# Patient Record
Sex: FEMALE | Race: WHITE | NOT HISPANIC OR LATINO | Employment: OTHER | URBAN - METROPOLITAN AREA
[De-identification: names, ages, dates, MRNs, and addresses within clinical notes are randomized per-mention and may not be internally consistent; named-entity substitution may affect disease eponyms.]

---

## 2017-08-30 ENCOUNTER — APPOINTMENT (OUTPATIENT)
Dept: WOMENS IMAGING | Facility: HOSPITAL | Age: 72
End: 2017-08-30

## 2017-08-30 PROCEDURE — G0202 SCR MAMMO BI INCL CAD: HCPCS | Performed by: RADIOLOGY

## 2017-08-30 PROCEDURE — 77063 BREAST TOMOSYNTHESIS BI: CPT | Performed by: RADIOLOGY

## 2018-07-25 ENCOUNTER — OFFICE VISIT (OUTPATIENT)
Dept: FAMILY MEDICINE CLINIC | Facility: CLINIC | Age: 73
End: 2018-07-25

## 2018-07-25 VITALS
RESPIRATION RATE: 16 BRPM | WEIGHT: 124.9 LBS | BODY MASS INDEX: 22.13 KG/M2 | HEART RATE: 76 BPM | OXYGEN SATURATION: 94 % | TEMPERATURE: 98 F | DIASTOLIC BLOOD PRESSURE: 84 MMHG | HEIGHT: 63 IN | SYSTOLIC BLOOD PRESSURE: 120 MMHG

## 2018-07-25 DIAGNOSIS — I10 INTERMITTENT HYPERTENSION: ICD-10-CM

## 2018-07-25 DIAGNOSIS — Z00.00 ROUTINE HEALTH MAINTENANCE: ICD-10-CM

## 2018-07-25 DIAGNOSIS — Z00.00 MEDICARE ANNUAL WELLNESS VISIT, INITIAL: Primary | ICD-10-CM

## 2018-07-25 DIAGNOSIS — E55.9 VITAMIN D DEFICIENCY: ICD-10-CM

## 2018-07-25 DIAGNOSIS — H40.9 GLAUCOMA OF BOTH EYES, UNSPECIFIED GLAUCOMA TYPE: ICD-10-CM

## 2018-07-25 LAB
25(OH)D3+25(OH)D2 SERPL-MCNC: 47.7 NG/ML
ALBUMIN SERPL-MCNC: 4.7 G/DL (ref 3.5–5.2)
ALBUMIN/GLOB SERPL: 2.5 G/DL
ALP SERPL-CCNC: 84 U/L (ref 40–129)
ALT SERPL-CCNC: 20 U/L (ref 5–33)
AST SERPL-CCNC: 23 U/L (ref 5–32)
BASOPHILS # BLD AUTO: 0.03 10*3/MM3 (ref 0–0.2)
BASOPHILS NFR BLD AUTO: 0.7 % (ref 0–2)
BILIRUB SERPL-MCNC: 0.8 MG/DL (ref 0.2–1.2)
BUN SERPL-MCNC: 14 MG/DL (ref 8–23)
BUN/CREAT SERPL: 16.9 (ref 7–25)
CALCIUM SERPL-MCNC: 9.8 MG/DL (ref 8.8–10.5)
CHLORIDE SERPL-SCNC: 99 MMOL/L (ref 98–107)
CHOLEST SERPL-MCNC: 208 MG/DL (ref 0–200)
CO2 SERPL-SCNC: 31.4 MMOL/L (ref 22–29)
CREAT SERPL-MCNC: 0.83 MG/DL (ref 0.57–1)
EOSINOPHIL # BLD AUTO: 0.06 10*3/MM3 (ref 0.1–0.3)
EOSINOPHIL NFR BLD AUTO: 1.5 % (ref 0–4)
ERYTHROCYTE [DISTWIDTH] IN BLOOD BY AUTOMATED COUNT: 12.9 % (ref 11.5–14.5)
GLOBULIN SER CALC-MCNC: 1.9 GM/DL
GLUCOSE SERPL-MCNC: 102 MG/DL (ref 65–99)
HCT VFR BLD AUTO: 44.2 % (ref 37–47)
HDLC SERPL-MCNC: 59 MG/DL (ref 40–60)
HGB BLD-MCNC: 14.7 G/DL (ref 12–16)
IMM GRANULOCYTES # BLD: 0 10*3/MM3 (ref 0–0.03)
IMM GRANULOCYTES NFR BLD: 0 % (ref 0–0.5)
LDLC SERPL CALC-MCNC: 118 MG/DL (ref 0–100)
LYMPHOCYTES # BLD AUTO: 1.68 10*3/MM3 (ref 0.6–4.8)
LYMPHOCYTES NFR BLD AUTO: 41 % (ref 20–45)
MCH RBC QN AUTO: 31.7 PG (ref 27–31)
MCHC RBC AUTO-ENTMCNC: 33.3 G/DL (ref 31–37)
MCV RBC AUTO: 95.3 FL (ref 81–99)
MONOCYTES # BLD AUTO: 0.42 10*3/MM3 (ref 0–1)
MONOCYTES NFR BLD AUTO: 10.2 % (ref 3–8)
NEUTROPHILS # BLD AUTO: 1.91 10*3/MM3 (ref 1.5–8.3)
NEUTROPHILS NFR BLD AUTO: 46.6 % (ref 45–70)
NRBC BLD AUTO-RTO: 0 /100 WBC (ref 0–0)
PLATELET # BLD AUTO: 228 10*3/MM3 (ref 140–500)
POTASSIUM SERPL-SCNC: 4.2 MMOL/L (ref 3.5–5.2)
PROT SERPL-MCNC: 6.6 G/DL (ref 6–8.5)
RBC # BLD AUTO: 4.64 10*6/MM3 (ref 4.2–5.4)
SODIUM SERPL-SCNC: 142 MMOL/L (ref 136–145)
TRIGL SERPL-MCNC: 155 MG/DL (ref 0–150)
TSH SERPL DL<=0.005 MIU/L-ACNC: 1.44 MIU/ML (ref 0.27–4.2)
VLDLC SERPL CALC-MCNC: 31 MG/DL (ref 7–27)
WBC # BLD AUTO: 4.1 10*3/MM3 (ref 4.8–10.8)

## 2018-07-25 PROCEDURE — 99203 OFFICE O/P NEW LOW 30 MIN: CPT | Performed by: FAMILY MEDICINE

## 2018-07-25 PROCEDURE — G0439 PPPS, SUBSEQ VISIT: HCPCS | Performed by: FAMILY MEDICINE

## 2018-07-25 NOTE — PROGRESS NOTES
QUICK REFERENCE INFORMATION:  The ABCs of Providing the Welcome to Medicare Wellness Visit   CMS.gov Learning Network    Welcome to Medicare Visit    Subjective   History of Present Illness    Yasmeen Eddy is a 73 y.o. female [place] presenting for a Welcome to Medicare Visit. In addition, we addressed the following health issues:  Yasmeen completed a personal history form and ir has been reviewed and discussed in its entirety.   Pt had a cyst removed from right breast several years ago, tonsils removed as a child, broken ankle as a child.  Pt has cardiovascular disease in first degree relative.  Her father  in his 60's of a heart attack.  Pt see's Dr Wallace for glaucoma.  Pt does play tennis and has had some periods of shortness of breath although this has stopped over the last couple of weeks but she has been concerned with this since she has glaucoma.  Pt has has 2 pregnancies, no miscarriages.  She see's Dr Sheila Segura for GYN needs.  Her PCN allergy is from as a child she is unsure of the reaction.  Pt does have problems falling asleep at times.  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  Pt is fasting to have labs drawn today.  There has been a history of intermittent hypertension.  Currently blood pressure is in a good place without the use of medication.  She believes that much of this is secondary to inappropriate breathing.  Patient denies shortness of air, chest pain or decreased stamina.      PMH, PSH, SocHx, FamHx, Allergies, and Medications: Reviewed and updated in the Visit Navigator.     Outpatient Medications Prior to Visit   Medication Sig Dispense Refill   • ascorbic acid ( VITAMIN C) 1000 MG tablet Take 1,000 mg by mouth daily.     • B Complex Vitamins (VITAMIN B COMPLEX PO) Take 1 tablet by mouth daily.     • bimatoprost (LUMIGAN) 0.01 % ophthalmic drops Administer 1 drop to both eyes every night.     • CALCIUM & MAGNESIUM CARBONATES PO Take 1 tablet by  mouth daily.     • Cholecalciferol (VITAMIN D) 2000 UNITS capsule Take 4 capsules by mouth daily.     • cholecalciferol (VITAMIN D3) 1000 UNITS tablet Take 4,000 Units by mouth daily.     • DHEA 10 MG capsule Take 1 capsule by mouth daily.     • Flaxseed, Linseed, (FLAX SEED OIL PO) Take 5 mL by mouth daily.     • Multiple Vitamin (MULTI VITAMIN DAILY PO) Take 1 tablet by mouth daily.     • Omega-3 Fatty Acids (OMEGA 3 PO) Take 1 capsule by mouth daily.     • Pregnenolone powder 1 capsule daily.     • Probiotic Product (PROBIOTIC DAILY PO) Take 1 capsule by mouth daily.     • hydrocortisone 0.5 % cream Apply  topically Every 6 (Six) Hours As Needed for Rash. 56 g 0   • mupirocin (BACTROBAN) 2 % ointment Apply  topically 3 (Three) Times a Day. 1 each 0   • PredniSONE (DELTASONE) 10 MG (21) tablet pack Take  by mouth Daily. Use as directed on package 1 each 0   • PredniSONE (DELTASONE) 10 MG (21) tablet pack Take  by mouth Daily. Use as directed on package 21 tablet 0     No facility-administered medications prior to visit.        Patient Active Problem List   Diagnosis   • Intermittent hypertension   • Glaucoma of both eyes   • Vitamin D deficiency       Health Habits:  Dental Exam. up to date  Eye Exam. up to date  Exercise: 7 times/week.  Current exercise activities include: yoga.    Social:  See review in SnapShot activity and in SocHx section of Visit Navigator.    Health Risk Assessment:  The patient has completed a Health Risk Assessment. This has been reviewed with them and has been scanned into [employer] as a separate document.    Current Medical Providers:  Patient Care Team:  Reynaldo Cardoso DO as PCP - General (Family Medicine)    The [facility] providers who are involved in the care of this patient are listed above. Additional providers and suppliers are listed below:  none    Recent Hospitalizations:  No recent hospitalization(s)..     Age-appropriate Screening Schedule:  Refer to the list below for  future screening recommendations based on patient's age. Orders for these recommended tests are listed in the plan section. The patient has been provided with a written plan.    Health Maintenance   Topic Date Due   • TDAP/TD VACCINES (1 - Tdap) 01/28/1966   • ZOSTER VACCINE (1 of 2) 01/28/1997   • PNEUMOCOCCAL VACCINES (65+ LOW/MEDIUM RISK) (1 of 2 - PCV13) 01/28/2012   • COLONOSCOPY  05/03/2018   • HEPATITIS C SCREENING  07/25/2018   • MEDICARE ANNUAL WELLNESS  07/25/2018   • INFLUENZA VACCINE  08/01/2018   • MAMMOGRAM  08/30/2019       Depression Screen:   PHQ-2/PHQ-9 Depression Screening 7/25/2018   Little interest or pleasure in doing things 0   Feeling down, depressed, or hopeless 0   Trouble falling or staying asleep, or sleeping too much 2   Feeling tired or having little energy 0   Poor appetite or overeating 0   Feeling bad about yourself - or that you are a failure or have let yourself or your family down 0   Trouble concentrating on things, such as reading the newspaper or watching television 0   Moving or speaking so slowly that other people could have noticed. Or the opposite - being so fidgety or restless that you have been moving around a lot more than usual 0   Thoughts that you would be better off dead, or of hurting yourself in some way 0   Total Score 2   If you checked off any problems, how difficult have these problems made it for you to do your work, take care of things at home, or get along with other people? Not difficult at all       Functional and Cognitive Screening:  Functional & Cognitive Status 7/25/2018   Do you have difficulty preparing food and eating? No   Do you have difficulty bathing yourself, getting dressed or grooming yourself? No   Do you have difficulty using the toilet? No   Do you have difficulty moving around from place to place? No   Do you have trouble with steps or getting out of a bed or a chair? No   In the past year have you fallen or experienced a near fall? No    Current Diet Well Balanced Diet   Dental Exam Up to date   Eye Exam Up to date   Exercise (times per week) 7 times per week   Current Exercise Activities Include Yoga   Do you need help using the phone?  No   Are you deaf or do you have serious difficulty hearing?  No   Do you need help with transportation? No   Do you need help shopping? No   Do you need help preparing meals?  No   Do you need help with housework?  No   Do you need help with laundry? No   Do you need help taking your medications? No   Do you need help managing money? No   Do you ever drive or ride in a car without wearing a seat belt? Yes   Have you felt unusual stress, anger or loneliness in the last month? No   Who do you live with? Spouse   If you need help, do you have trouble finding someone available to you? No   Have you been bothered in the last four weeks by sexual problems? No   Do you have difficulty concentrating, remembering or making decisions? No       Does the patient have evidence of cognitive impairment? No    Advanced Care Planning:  has NO advance directive - not interested in additional information    Identification of Risk Factors:  Risk factors include: weight , unhealthy diet, cardiovascular risk and inactivity.    Review of Systems   Psychiatric/Behavioral: Positive for sleep disturbance.   All other systems reviewed and are negative.      Pertinent items are noted in HPI.    Objective    Physical Exam   Constitutional: She is oriented to person, place, and time. She appears well-developed and well-nourished.   HENT:   Head: Normocephalic and atraumatic.   Neck: Trachea normal and phonation normal. Neck supple. Normal carotid pulses present. Carotid bruit is not present. No thyroid mass and no thyromegaly present.   Cardiovascular: Normal rate, regular rhythm and normal heart sounds.  Exam reveals no gallop and no friction rub.    No murmur heard.  Pulmonary/Chest: Effort normal and breath sounds normal. No respiratory  "distress. She has no decreased breath sounds. She has no wheezes. She has no rhonchi. She has no rales.   Lymphadenopathy:     She has no cervical adenopathy.   Neurological: She is alert and oriented to person, place, and time.   Skin: Skin is warm and dry. No rash noted.   Psychiatric: She has a normal mood and affect. Her speech is normal and behavior is normal. Judgment and thought content normal. Cognition and memory are normal.   Nursing note and vitals reviewed.          Vitals:    07/25/18 0827   BP: 120/84   Pulse: 76   Resp: 16   Temp: 98 °F (36.7 °C)   TempSrc: Oral   SpO2: 94%   Weight: 56.7 kg (124 lb 14.4 oz)   Height: 160 cm (63\")     BP Readings from Last 3 Encounters:   07/25/18 120/84   05/10/18 (!) 137/104   05/03/18 127/91      Wt Readings from Last 3 Encounters:   07/25/18 56.7 kg (124 lb 14.4 oz)   06/27/16 56.4 kg (124 lb 6 oz)   06/22/16 55.8 kg (123 lb)        Body mass index is 22.13 kg/m².    Assessment/Plan   Patient Self-Management and Personalized Health Advice  The patient has been provided with information about: diet, exercise, weight management and prevention of cardiac or vascular disease and preventive services including:   · Advance directive.    Discussed the patient's BMI with her. The BMI is in the acceptable range.    Orders:  Orders Placed This Encounter   Procedures   • Comprehensive Metabolic Panel   • Lipid Panel   • TSH   • Vitamin D 25 Hydroxy   • CBC & Differential       Follow Up:  Return if symptoms worsen or fail to improve.     An After Visit Summary and PPPS with all of these plans were given to the patient.        Scribed for Reynaldo Cardoso MD by Delmy Rivera CMA. 07/25/2018    IReynaldo MD personally performed the services described in this documentation, as scribed by Delmy Rivera CMA in my presence, and it is both accurate and complete       "

## 2018-10-10 ENCOUNTER — APPOINTMENT (OUTPATIENT)
Dept: WOMENS IMAGING | Facility: HOSPITAL | Age: 73
End: 2018-10-10

## 2018-10-10 PROCEDURE — 77067 SCR MAMMO BI INCL CAD: CPT | Performed by: RADIOLOGY

## 2018-10-10 PROCEDURE — 77063 BREAST TOMOSYNTHESIS BI: CPT | Performed by: RADIOLOGY

## 2019-08-20 ENCOUNTER — OFFICE VISIT (OUTPATIENT)
Dept: FAMILY MEDICINE CLINIC | Facility: CLINIC | Age: 74
End: 2019-08-20

## 2019-08-20 VITALS
RESPIRATION RATE: 16 BRPM | BODY MASS INDEX: 21.68 KG/M2 | OXYGEN SATURATION: 98 % | HEART RATE: 70 BPM | DIASTOLIC BLOOD PRESSURE: 80 MMHG | HEIGHT: 64 IN | WEIGHT: 127 LBS | SYSTOLIC BLOOD PRESSURE: 130 MMHG

## 2019-08-20 DIAGNOSIS — S91.331A PUNCTURE WOUND OF RIGHT FOOT, INITIAL ENCOUNTER: Primary | ICD-10-CM

## 2019-08-20 DIAGNOSIS — S63.601A SPRAIN OF RIGHT THUMB, UNSPECIFIED SITE OF FINGER, INITIAL ENCOUNTER: ICD-10-CM

## 2019-08-20 PROCEDURE — 99212 OFFICE O/P EST SF 10 MIN: CPT | Performed by: NURSE PRACTITIONER

## 2019-08-20 PROCEDURE — 90714 TD VACC NO PRESV 7 YRS+ IM: CPT | Performed by: NURSE PRACTITIONER

## 2019-08-20 PROCEDURE — 90471 IMMUNIZATION ADMIN: CPT | Performed by: NURSE PRACTITIONER

## 2019-08-20 NOTE — PROGRESS NOTES
Patient ID: Yasmeen Eddy is a 74 y.o. female     Subjective     Chief Complaint   Patient presents with   • skin irritation     thumb and right foot       History of Present Illness    Yasmeen Eddy presents to the office today for complaints of right thumb pain at base of thumb.  States about 2 weeks ago, she had noticed a white thorn or splinter in thumb after working outside.  A small white substance came out of area.  Had not had any problems since from area until today, she went to play tennis and had pain at the same site.  Upset due to making it difficult for her to play tennis due to discomfort.  She has not tried any home remedies such as ice or topicals. No redness or drainage from area.   She also stepped on a branch while walking outside without shoes on.  Occurred 4 days ago.  States area bled initially and has since resolved.  Area improving since she has applied Nu Skin treatment.  Concerned due to being passe due for tetanus shot.       She denies any complaints of fever, chills, cough, chest pain, shortness of air, abdominal pain, nausea, or any other concerns.     The following portions of the patient's history were reviewed and updated as appropriate: allergies, current medications, past family history, past medical history, past social history, past surgical history and problem list.       Review of Systems   Constitution: Negative.   HENT: Negative.    Eyes: Negative.    Cardiovascular: Negative.    Respiratory: Negative.    Endocrine: Negative.    Hematologic/Lymphatic: Negative.    Skin: Negative.         Puncture wound to right foot and wound to right thumb   Musculoskeletal: Negative.    Gastrointestinal: Negative.    Genitourinary: Negative.    Neurological: Negative.    Psychiatric/Behavioral: Negative.        Vitals:    08/20/19 1530   BP: 130/80   Pulse: 70   Resp: 16   SpO2: 98%       Documented weights    08/20/19 1530   Weight: 57.6 kg (127 lb)     Body mass index is 22.14  kg/m².    Results for orders placed or performed in visit on 07/25/18   Comprehensive Metabolic Panel   Result Value Ref Range    Glucose 102 (H) 65 - 99 mg/dL    BUN 14 8 - 23 mg/dL    Creatinine 0.83 0.57 - 1.00 mg/dL    eGFR Non African Am 68 >60 mL/min/1.73    eGFR African Am 82 >60 mL/min/1.73    BUN/Creatinine Ratio 16.9 7.0 - 25.0    Sodium 142 136 - 145 mmol/L    Potassium 4.2 3.5 - 5.2 mmol/L    Chloride 99 98 - 107 mmol/L    Total CO2 31.4 (H) 22.0 - 29.0 mmol/L    Calcium 9.8 8.8 - 10.5 mg/dL    Total Protein 6.6 6.0 - 8.5 g/dL    Albumin 4.70 3.50 - 5.20 g/dL    Globulin 1.9 gm/dL    A/G Ratio 2.5 g/dL    Total Bilirubin 0.8 0.2 - 1.2 mg/dL    Alkaline Phosphatase 84 40 - 129 U/L    AST (SGOT) 23 5 - 32 U/L    ALT (SGPT) 20 5 - 33 U/L   Lipid Panel   Result Value Ref Range    Total Cholesterol 208 (H) 0 - 200 mg/dL    Triglycerides 155 (H) 0 - 150 mg/dL    HDL Cholesterol 59 40 - 60 mg/dL    VLDL Cholesterol 31 (H) 7 - 27 mg/dL    LDL Cholesterol  118 (H) 0 - 100 mg/dL   TSH   Result Value Ref Range    TSH 1.440 0.270 - 4.200 mIU/mL   Vitamin D 25 Hydroxy   Result Value Ref Range    25 Hydroxy, Vitamin D 47.7 ng/ml   CBC & Differential   Result Value Ref Range    WBC 4.10 (L) 4.80 - 10.80 10*3/mm3    RBC 4.64 4.20 - 5.40 10*6/mm3    Hemoglobin 14.7 12.0 - 16.0 g/dL    Hematocrit 44.2 37.0 - 47.0 %    MCV 95.3 81.0 - 99.0 fL    MCH 31.7 (H) 27.0 - 31.0 pg    MCHC 33.3 31.0 - 37.0 g/dL    RDW 12.9 11.5 - 14.5 %    Platelets 228 140 - 500 10*3/mm3    Neutrophil Rel % 46.6 45.0 - 70.0 %    Lymphocyte Rel % 41.0 20.0 - 45.0 %    Monocyte Rel % 10.2 (H) 3.0 - 8.0 %    Eosinophil Rel % 1.5 0.0 - 4.0 %    Basophil Rel % 0.7 0.0 - 2.0 %    Neutrophils Absolute 1.91 1.50 - 8.30 10*3/mm3    Lymphocytes Absolute 1.68 0.60 - 4.80 10*3/mm3    Monocytes Absolute 0.42 0.00 - 1.00 10*3/mm3    Eosinophils Absolute 0.06 (L) 0.10 - 0.30 10*3/mm3    Basophils Absolute 0.03 0.00 - 0.20 10*3/mm3    Immature Granulocyte Rel %  0.0 0.0 - 0.5 %    Immature Grans Absolute 0.00 0.00 - 0.03 10*3/mm3    nRBC 0.0 0.0 - 0.0 /100 WBC       Objective     Physical Exam   Constitutional: She appears well-developed and well-nourished. No distress.   Cardiovascular: Normal rate.   Pulmonary/Chest: Effort normal.   Musculoskeletal: Normal range of motion. She exhibits no edema.   Skin: Skin is warm and dry. No rash noted. No erythema.   No abnormality noted to base of right thumb.  No palpable foreign body.  No redness or drainage.  There is a 1 mm scab area from previous manipulation of getting splinter out of thumb.    1 cm healing closed wound to sole of right foot.  No redness, bruising, or drainage noted.     Psychiatric: She has a normal mood and affect.        Assessment/Plan     Assessment/Plan     Yasmeen was seen today for skin irritation.    Diagnoses and all orders for this visit:    Puncture wound of right foot, initial encounter  -     Td Vaccine Greater Than or Equal To 8yo With Preservative IM    Sprain of right thumb, unspecified site of finger, initial encounter   Appears soreness to thumb with playing tennis related to prior manipulation of getting splinter out.  Does not appear to have foreign body.  No palpable cyst or abnormality.    Ice to area.  Watch for any signs of infection.    Other orders  -     Menthol, Topical Analgesic, (BIOFREEZE) 4 % gel; Apply 1 application topically 2 (Two) Times a Day to thumb may also help.      Notify us if symptoms do not improve or worsen.  F/U prn.    Monse Le, APRN  Family Medicine  [facility] [place] [facility]  08/20/19  3:45 PM

## 2020-01-08 ENCOUNTER — APPOINTMENT (OUTPATIENT)
Dept: WOMENS IMAGING | Facility: HOSPITAL | Age: 75
End: 2020-01-08

## 2020-01-08 PROCEDURE — 77067 SCR MAMMO BI INCL CAD: CPT | Performed by: RADIOLOGY

## 2020-01-08 PROCEDURE — 77063 BREAST TOMOSYNTHESIS BI: CPT | Performed by: RADIOLOGY

## 2021-05-18 ENCOUNTER — APPOINTMENT (OUTPATIENT)
Dept: WOMENS IMAGING | Facility: HOSPITAL | Age: 76
End: 2021-05-18

## 2021-05-18 PROCEDURE — 77063 BREAST TOMOSYNTHESIS BI: CPT | Performed by: RADIOLOGY

## 2021-05-18 PROCEDURE — 77067 SCR MAMMO BI INCL CAD: CPT | Performed by: RADIOLOGY

## 2022-10-25 ENCOUNTER — APPOINTMENT (OUTPATIENT)
Dept: WOMENS IMAGING | Facility: HOSPITAL | Age: 77
End: 2022-10-25

## 2022-10-25 PROCEDURE — 77063 BREAST TOMOSYNTHESIS BI: CPT | Performed by: RADIOLOGY

## 2022-10-25 PROCEDURE — 77067 SCR MAMMO BI INCL CAD: CPT | Performed by: RADIOLOGY

## 2025-01-03 ENCOUNTER — TELEPHONE (OUTPATIENT)
Dept: FAMILY MEDICINE CLINIC | Facility: CLINIC | Age: 80
End: 2025-01-03
Payer: MEDICARE

## 2025-03-27 ENCOUNTER — OFFICE VISIT (OUTPATIENT)
Dept: FAMILY MEDICINE CLINIC | Facility: CLINIC | Age: 80
End: 2025-03-27
Payer: MEDICARE

## 2025-03-27 VITALS
HEART RATE: 77 BPM | WEIGHT: 118.8 LBS | BODY MASS INDEX: 20.28 KG/M2 | DIASTOLIC BLOOD PRESSURE: 90 MMHG | OXYGEN SATURATION: 100 % | TEMPERATURE: 97.3 F | HEIGHT: 64 IN | SYSTOLIC BLOOD PRESSURE: 138 MMHG

## 2025-03-27 DIAGNOSIS — R03.0 ELEVATED BLOOD PRESSURE READING: Primary | ICD-10-CM

## 2025-03-27 DIAGNOSIS — H40.1234 BILATERAL LOW-TENSION GLAUCOMA, INDETERMINATE STAGE: ICD-10-CM

## 2025-03-27 NOTE — PROGRESS NOTES
Subjective   Yasmeen Eddy is a 80 y.o. female with   Chief Complaint   Patient presents with    \[facility] [place] [facility]\"" Care    Elevated blood pressure reading   .    History of Present Illness   80-year-old white female who has been under a marked amount of stress here with increased pressure in her head feels that her blood pressure is elevated.  She is on no prescriptive medication but does take a host of supplements and vitamins.  He has not been seen in this office for quite some time.  She has had elevated blood pressure in the past but has never been committed to daily treatment of same.  There is also history of vitamin D deficiency and glaucoma of both eyes.  She denies chest pain, shortness of breath and there has been no evidence of decreased stamina.  She has been caring for her  who is in a rehab facility after a stroke.  Last fasting labs were in 2018.  The following portions of the patient's history were reviewed and updated as appropriate: allergies, current medications, past family history, past medical history, past social history, past surgical history and problem list.    Review of Systems   Eyes:         Glaucoma   Cardiovascular:         Elevated blood pressure       Objective     Vitals:    03/27/25 1513   BP: 138/90   Pulse: 77   Temp: 97.3 °F (36.3 °C)   SpO2: 100%       No results found for this or any previous visit (from the past 4 weeks).    Physical Exam  Vitals and nursing note reviewed.   Constitutional:       Appearance: Normal appearance. She is well-developed, well-groomed and normal weight.   HENT:      Head: Normocephalic and atraumatic.   Neck:      Thyroid: No thyroid mass or thyromegaly.      Vascular: Normal carotid pulses. No carotid bruit.      Trachea: Trachea and phonation normal.   Cardiovascular:      Rate and Rhythm: Normal rate and regular rhythm.      Heart sounds: Normal heart sounds. No murmur heard.     No friction rub. No gallop.   Pulmonary:      Effort: Pulmonary  effort is normal. No respiratory distress.      Breath sounds: Normal breath sounds. No decreased breath sounds, wheezing, rhonchi or rales.   Musculoskeletal:      Cervical back: Neck supple.   Lymphadenopathy:      Cervical: No cervical adenopathy.   Skin:     General: Skin is warm and dry.      Findings: No rash.   Neurological:      Mental Status: She is alert and oriented to person, place, and time.   Psychiatric:         Attention and Perception: Attention and perception normal.         Mood and Affect: Mood and affect normal.         Speech: Speech normal.         Behavior: Behavior normal. Behavior is cooperative.         Thought Content: Thought content normal.         Cognition and Memory: Cognition and memory normal.         Judgment: Judgment normal.         Assessment & Plan   Diagnoses and all orders for this visit:    1. Elevated blood pressure reading (Primary)    2. Bilateral low-tension glaucoma, indeterminate stage    Have asked patient to return to the office for adjusted blood pressure check in 2 weeks.  This may be repeated on 2 or 3 occasions based on our results.  If it becomes clear that she is running consistently elevated treatment will be rendered and follow-up will take place thereafter.  Fasting labs will be discussed based on the above findings in the future.    Return if symptoms worsen or fail to improve.  BMI is within normal parameters. No other follow-up for BMI required.